# Patient Record
Sex: MALE | Race: ASIAN | NOT HISPANIC OR LATINO | URBAN - METROPOLITAN AREA
[De-identification: names, ages, dates, MRNs, and addresses within clinical notes are randomized per-mention and may not be internally consistent; named-entity substitution may affect disease eponyms.]

---

## 2017-05-23 ENCOUNTER — EMERGENCY (EMERGENCY)
Facility: HOSPITAL | Age: 22
LOS: 1 days | Discharge: HOME | End: 2017-05-23
Admitting: EMERGENCY MEDICINE
Payer: MEDICAID

## 2017-05-23 VITALS
SYSTOLIC BLOOD PRESSURE: 127 MMHG | HEIGHT: 67 IN | OXYGEN SATURATION: 100 % | DIASTOLIC BLOOD PRESSURE: 72 MMHG | HEART RATE: 96 BPM | TEMPERATURE: 99 F | WEIGHT: 138.01 LBS | RESPIRATION RATE: 16 BRPM

## 2017-05-23 PROCEDURE — 99284 EMERGENCY DEPT VISIT MOD MDM: CPT

## 2017-05-23 NOTE — ED ADULT TRIAGE NOTE - CHIEF COMPLAINT QUOTE
Pt with fever headache and sore throat since Thursday,  taking Tylenol at home, pt came this evening cause he is afraid of "PNA" no difficulty breathing at this time, denies chest pain, N/V/D

## 2017-05-24 PROCEDURE — 71020: CPT | Mod: 26

## 2017-05-24 RX ORDER — IBUPROFEN 200 MG
600 TABLET ORAL ONCE
Qty: 0 | Refills: 0 | Status: COMPLETED | OUTPATIENT
Start: 2017-05-24 | End: 2017-05-24

## 2017-05-24 RX ADMIN — Medication 600 MILLIGRAM(S): at 01:15

## 2017-05-24 NOTE — ED PROVIDER NOTE - NS ED MD SCRIBE ATTENDING SCRIBE SECTIONS
REVIEW OF SYSTEMS/HIV/PAST MEDICAL/SURGICAL/SOCIAL HISTORY/DISPOSITION/VITAL SIGNS( Pullset)/PHYSICAL EXAM/HISTORY OF PRESENT ILLNESS

## 2017-05-24 NOTE — ED PROVIDER NOTE - PROGRESS NOTE DETAILS
The scribe's documentation has been prepared under my direction and personally reviewed by me in its entirety. I confirm that the note above accurately reflects all work, treatment, procedures, and medical decision making performed by me. David Bacon PA-C

## 2017-05-24 NOTE — ED PROVIDER NOTE - OBJECTIVE STATEMENT
23 yo M with no significant PMHx, presents to the ED c/o fever (Tmax:104), chills, non-productive cough, and sore throat x 5 days. He had a fever like this last year and went to ED and had pneumonia. Pt took tylenol for fever but no relief of sx. Symptoms have worsened a lot since Friday. Denies nausea, vomiting, dysuria. Sick contact: brother had fever 1 week ago. Denies having a flu shot. NKDA.

## 2022-12-10 ENCOUNTER — EMERGENCY (EMERGENCY)
Facility: HOSPITAL | Age: 27
LOS: 1 days | Discharge: HOME | End: 2022-12-10
Admitting: EMERGENCY MEDICINE

## 2022-12-10 VITALS
SYSTOLIC BLOOD PRESSURE: 130 MMHG | TEMPERATURE: 100 F | OXYGEN SATURATION: 100 % | RESPIRATION RATE: 18 BRPM | HEART RATE: 97 BPM | DIASTOLIC BLOOD PRESSURE: 74 MMHG

## 2022-12-10 LAB
FLUAV AG NPH QL: DETECTED
FLUBV AG NPH QL: SIGNIFICANT CHANGE UP
RSV RNA NPH QL NAA+NON-PROBE: SIGNIFICANT CHANGE UP
SARS-COV-2 RNA SPEC QL NAA+PROBE: SIGNIFICANT CHANGE UP

## 2022-12-10 PROCEDURE — 99284 EMERGENCY DEPT VISIT MOD MDM: CPT

## 2022-12-10 PROCEDURE — 71046 X-RAY EXAM CHEST 2 VIEWS: CPT | Mod: 26

## 2022-12-10 RX ORDER — AZITHROMYCIN 500 MG/1
1 TABLET, FILM COATED ORAL
Qty: 6 | Refills: 0
Start: 2022-12-10

## 2022-12-10 RX ORDER — DEXTROMETHORPHAN HYDROBROMIDE AND PROMETHAZINE HYDROCHLORIDE 15; 6.25 MG/5ML; MG/5ML
5 SYRUP ORAL
Qty: 100 | Refills: 0
Start: 2022-12-10

## 2022-12-10 NOTE — ED PROVIDER NOTE - CLINICAL SUMMARY MEDICAL DECISION MAKING FREE TEXT BOX
Patient presenting with intermittent cough x2 weeks after sick contact.  Patient well-appearing vital stable physical exam is unremarkable.  Will obtain x-ray to rule out pneumonia and test for flu/COVID.  Cough suppressant will be sent to pharmacy if work-up is negative

## 2022-12-10 NOTE — ED PROVIDER NOTE - PATIENT PORTAL LINK FT
You can access the FollowMyHealth Patient Portal offered by  by registering at the following website: [email or web address]. By joining [facility]’s FollowMyHealth portal, you will also be able to view your health information using other applications (apps) compatible with our system.

## 2022-12-10 NOTE — ED PROVIDER NOTE - OBJECTIVE STATEMENT
27-year-old male with no significant past medical history presenting with a complaint of intermittent cough x2 weeks.  Endorses sick contact prior to the onset of symptoms.  No associated fever, chills, chest pain, shortness of breath.